# Patient Record
Sex: MALE | Race: WHITE
[De-identification: names, ages, dates, MRNs, and addresses within clinical notes are randomized per-mention and may not be internally consistent; named-entity substitution may affect disease eponyms.]

---

## 2018-02-08 ENCOUNTER — HOSPITAL ENCOUNTER (OUTPATIENT)
Dept: HOSPITAL 75 - PREOP | Age: 48
End: 2018-02-08
Attending: SURGERY
Payer: SELF-PAY

## 2018-02-08 VITALS — WEIGHT: 260 LBS | BODY MASS INDEX: 35.21 KG/M2 | HEIGHT: 72 IN

## 2018-02-08 DIAGNOSIS — Z01.818: Primary | ICD-10-CM

## 2018-02-08 DIAGNOSIS — K21.9: ICD-10-CM

## 2018-02-08 DIAGNOSIS — K92.1: ICD-10-CM

## 2018-02-13 ENCOUNTER — HOSPITAL ENCOUNTER (OUTPATIENT)
Dept: HOSPITAL 75 - ENDO | Age: 48
Discharge: HOME | End: 2018-02-13
Attending: SURGERY
Payer: SELF-PAY

## 2018-02-13 VITALS — SYSTOLIC BLOOD PRESSURE: 136 MMHG | DIASTOLIC BLOOD PRESSURE: 92 MMHG

## 2018-02-13 VITALS — SYSTOLIC BLOOD PRESSURE: 115 MMHG | DIASTOLIC BLOOD PRESSURE: 82 MMHG

## 2018-02-13 VITALS — DIASTOLIC BLOOD PRESSURE: 82 MMHG | SYSTOLIC BLOOD PRESSURE: 115 MMHG

## 2018-02-13 VITALS — BODY MASS INDEX: 35.21 KG/M2 | WEIGHT: 260 LBS | HEIGHT: 72 IN

## 2018-02-13 VITALS — SYSTOLIC BLOOD PRESSURE: 111 MMHG | DIASTOLIC BLOOD PRESSURE: 79 MMHG

## 2018-02-13 DIAGNOSIS — K44.9: ICD-10-CM

## 2018-02-13 DIAGNOSIS — B96.81: ICD-10-CM

## 2018-02-13 DIAGNOSIS — E03.9: ICD-10-CM

## 2018-02-13 DIAGNOSIS — K29.50: ICD-10-CM

## 2018-02-13 DIAGNOSIS — Z80.0: ICD-10-CM

## 2018-02-13 DIAGNOSIS — K62.5: Primary | ICD-10-CM

## 2018-02-13 DIAGNOSIS — K29.80: ICD-10-CM

## 2018-02-13 DIAGNOSIS — K62.1: ICD-10-CM

## 2018-02-13 DIAGNOSIS — K22.70: ICD-10-CM

## 2018-02-13 DIAGNOSIS — F17.210: ICD-10-CM

## 2018-02-13 DIAGNOSIS — D12.4: ICD-10-CM

## 2018-02-13 DIAGNOSIS — Z79.899: ICD-10-CM

## 2018-02-13 DIAGNOSIS — K25.9: ICD-10-CM

## 2018-02-13 PROCEDURE — 94640 AIRWAY INHALATION TREATMENT: CPT

## 2018-02-13 NOTE — DISCHARGE INST-SIMPLE/STANDARD
Discharge Inst-Standard


Discharge Medications


New, Converted or Re-Newed RX:  Transmitted to Pharmacy





Patient Instructions/Follow Up


Plan of Care/Instructions/FU:  


2 weeks Priti


Activity as Tolerated:  Yes


Discharge Diet:  Regular Diet











ELISA PETTIT DO Feb 13, 2018 10:45

## 2018-02-13 NOTE — PROGRESS NOTE-PRE OPERATIVE
Pre-Operative Progress Note


H&P Reviewed


The H&P was reviewed, patient examined and no changes noted.


Date Seen by Provider:  Feb 13, 2018


Time Seen by Provider:  08:22


Date H&P Reviewed:  Feb 13, 2018


Time H&P Reviewed:  08:22


Pre-Operative Diagnosis:  family history colon cancer, bright red blood per 

rectum, reflux gastritis











ELISA PETTIT DO Feb 13, 2018 08:23

## 2018-02-13 NOTE — OPERATIVE REPORT
DATE OF SERVICE:  02/13/2018



PREOPERATIVE DIAGNOSES:

Family history of colon cancer, bright red blood per rectum, reflux gastritis.



POSTOPERATIVE DIAGNOSES:

Duodenitis, gastritis, hiatal hernia, small antral ulcer, short segment

Hernandez's, descending colon polyp and rectal polyp.



PROCEDURE:

EGD with biopsies and colonoscopy with hot biopsy polypectomy x2.



SURGEON:

Elisa Marcos DO



ANESTHESIA:

Per CRNA.



ESTIMATED BLOOD LOSS:

None.



SPECIMENS:

Antrum, body, GE and colon polyps.



INDICATIONS:

The patient is a 48-year-old male with family history of colon cancer, bright

red blood per rectum and reflux gastritis.  He was explained risks and benefits

of procedure and wished to proceed with procedure.  Consent was signed in the

chart.



DESCRIPTION OF PROCEDURE:

The patient was taken to the endoscopy suite, placed in left lateral recumbent

position.  Timeout was performed.  The scope was inserted in mouth, down the

esophagus, stomach and into the duodenum.  The duodenum had a significant

erythematous changes present.  The patient began to desaturate therefore, the

scope had to be removed.  Once adequate oxygenation occurred again, the scope

was then reinserted in mouth, down the esophagus and into the stomach and into

the duodenum.  There were no polyps, masses or ulcerations visualized within the

duodenum.  There was significant erythematous changes thought consistent with

duodenitis.  The scope was retracted back into the stomach, where biopsy of the

antrum was obtained.  The stomach had slight erythematous changes diffusely. 

Biopsy of the antrum and GE junction were obtained.  In the antrum, there is a

small ulceration too that was present a small and biopsy had been obtained too. 

The scope was retroflexed noting a small hiatal hernia.  No other pathology

noted.  The scope was then slowly withdrawn back into the GE junction where

there are some erythematous changes and may be the appearance of a short segment

of Hernandez's.  Biopsies were obtained.  Scope was then slowly retracted back

noting no other pathology.  Digital rectal exam was performed.  A small external

hemorrhoid present.  Digital rectal exam, no palpable polyps, masses or

ulcerations.  The scope was inserted into the rectum and advanced all the way to

the cecum with minimal difficulty.  Prep was adequate.  Scope was then slowly

retracted back.  There were no polyps, masses or ulcerations within the cecum,

ascending, transverse colon.  Within the descending colon, a small polyp was

present, which hot biopsy polypectomy was performed.  Scope was continued slowly

retracted back into the sigmoid.  There were no polyps, mass or ulcerations.  In

the rectum, another small polyp was present, which hot biopsy polypectomy was

performed.  Scope was also retroflexed noting no other pathology.  Scope was

returned to its normal position, slowly withdrawn until completely removed.



RECOMMENDATIONS:

The patient will follow up in 2 weeks.  The patient will be started on Protonix

40 mg daily.  We would recommend repeat colonoscopy in 3 years for reevaluation.

 If he has any of this symptoms before that I would reevaluate at that time.





Job ID: [id number]

DocumentID: [id number]

Dictated Date:  02/13/2018 10:43:51

Transcription Date: 02/13/2018 15:21:56

Dictated By: ELISA MARCOS DO

## 2018-02-15 NOTE — XMS REPORT
[facility]

 Created on: 10/31/2015



Antonio Whitten

External Reference #: [id number]

: 1970

Sex: Male



Demographics







 Address  [address] [place] [address]  [address]

 

 Home Phone  [phone]

 

 Preferred Language  Unknown

 

 Marital Status  Unknown

 

 Rastafarian Affiliation  Unknown

 

 Race  White

 

 Ethnic Group  Not  or 





Author







 Author  ALVAREZ VALDEZ

 

 [facility]  [employer]

 

 Address  Unknown

 

 Phone  Unavailable







Care Team Providers







 Care Team Member Name  Role  Phone

 

 ALVAREZ VALDEZ  CP  Unavailable



                                                                



Allergies

          No Known Allergies                                                   
                                     



Problems

          





 Problem Type  Condition  Code  Onset Dates  Condition Status

 

 Assessment  Encounter for immunization  Z23     Active

 

 Problem  Need for prophylactic vaccination and inoculation, Influenza  V04.81 
    Active

 

 Problem  Health examination of defined subpopulation  V70.5     Active

 

 Problem  Hypertension  401.9     Active

 

 Problem  Other malaise and fatigue  780.79     Active

 

 Problem  Acute suppurative otitis media without spontaneous rupture of eardrum
  382.00     Active

 

 Problem  Dizziness and giddiness  780.4     Active

 

 Problem  Unspecified dermatitis due to sun  692.70     Active



                                                                               
                                                                               



Medications

          No Known Medications                                                 
                                       



Procedures

          





 Procedure  Coding System  Code  Date

 

 SINGLE IMMUNIZATION ADMIN  CPT-4  62927  Oct 31, 2015

 

 FLUARIX QUAD (3 & UP)-GSK-  CPT-4  54823  Oct 31, 2015



                                                                               
         



Results

          No Known Results                                                     
                                   



Immunizations

          





 Vaccine  Administration Date

 

 FLUARIX QUAD (3 & UP)-GSK-2015  Oct 31, 2015



                                                                    



Summary Purpose

          [employer] Submission

## 2018-02-15 NOTE — XMS REPORT
[facility]

 Created on: 2016



Antonio Whitten

External Reference #: [id number]

: 1970

Sex: Male



Demographics







 Address  [address] [place]  [address]

 

 Home Phone  [phone]

 

 Preferred Language  Unknown

 

 Marital Status  Unknown

 

 Pentecostal Affiliation  Unknown

 

 Race  White

 

 Ethnic Group  Not  or 





Author







 Author  DONAVAN SANDERS

 

 [facility]  [employer]

 

 Address  Unknown

 

 Phone  Unavailable







Care Team Providers







 Care Team Member Name  Role  Phone

 

 DONAVAN SANDERS  CP  Unavailable



                                                                



Allergies

          No Known Allergies                                                   
                                     



Problems

          





 Problem Type  Condition  Code  Onset Dates  Condition Status

 

 Problem  Acute suppurative otitis media without spontaneous rupture of eardrum
  382.00     Active

 

 Problem  Unspecified dermatitis due to sun  692.70     Active

 

 Problem  Other malaise and fatigue  780.79     Active

 

 Problem  Acquired hypothyroidism  E03.9     Active

 

 Problem  Anxiety  F41.9     Active

 

 Problem  Chronic pain syndrome  G89.4     Active

 

 Problem  Health examination of defined subpopulation  V70.5     Active

 

 Problem  Dizziness and giddiness  780.4     Active

 

 Problem  Hypertension  401.9     Active

 

 Problem  Need for prophylactic vaccination and inoculation, Influenza  V04.81 
    Active



                                                                               
                                                                               
                    



Medications

          No Known Medications                                                 
                             



Results

          No Known Results                                                     
               



Summary Purpose

          [employer] Submission

## 2018-02-15 NOTE — XMS REPORT
[facility]

 Created on: 2017



Antonio Whitten

External Reference #: [id number]

: 1970

Sex: Male



Demographics







 Address  [address]  [address]

 

 Preferred Language  Unknown

 

 Marital Status  Unknown

 

 Jehovah's witness Affiliation  Unknown

 

 Race  Unknown

 

 Ethnic Group  Unknown





Author







 Author  ALVAREZ VALDEZ

 

 [facility]

 

 Address  [facility] [place]  [address]



 

 Phone  [phone]







Care Team Providers







 Care Team Member Name  Role  Phone

 

 ALVAREZ VALDEZ  Unavailable  [phone]







PROBLEMS







 Type  Condition  ICD9-CM Code  VGN95-PF Code  Onset Dates  Condition Status  
SNOMED Code

 

 Problem  Hypothyroidism (acquired)     E03.9     Active  17335509

 

 Problem  Chronic pain syndrome     G89.4     Active  291559477

 

 Problem  Anxiety     F41.9     Active  16570352

 

 Problem  Acquired hypothyroidism     E03.9     Active  [id number]







ALLERGIES

Unknown Allergies



SOCIAL HISTORY

No smoking Hx information available



PLAN OF CARE





VITAL SIGNS





MEDICATIONS







 Medication  Instructions  Dosage  Frequency  Start Date  End Date  Duration  
Status

 

 Norco 5-325 MG  Orally every 6 hrs  1 tablet as needed  6h       
28 days  Active

 

 Ativan 0.5 MG  Orally every 6 hrs  1 tablet as needed  6h  13 Oct, 2016     28 
days  Active







RESULTS

No Results



PROCEDURES

No Known procedures



IMMUNIZATIONS

No Known Immunizations

## 2018-02-15 NOTE — XMS REPORT
Continuity of Care Document

 Created on: 02/15/2018



LUCAS DUPREE

External Reference #: [id number]

: 1970

Sex: Male



Demographics







 Address  [address] [place]  [address]

 

 Home Phone  [phone] x

 

 Preferred Language  Unknown

 

 Marital Status  Unknown

 

 Baptism Affiliation  Unknown

 

 Race  Unknown

 

 Ethnic Group  Unknown





Author







 Author  [facility]

 

 Address  Unknown

 

 Phone  Unavailable



              



Allergies

      



There is no data.                  



Medications

      



There is no data.                  



Problems

      





 Date Dx Coded            Attending            Type            Code            
Diagnosis            Diagnosed By        

 

 2009            ALVAREZ TAYLOR                         788.1      
      DYSURIA                     

 

 2009            ALVAREZ TAYLOR                         788.1      
      DYSURIA                     

 

 2009            HERMELINDA ELMORE DO                         788.1          
  DYSURIA                     

 

 2009                                      788.1            DYSURIA      
               

 

 2009            ALVAREZ TAYLOR                         788.1      
      DYSURIA                     

 

 2009            ROX REGAN MD                         788.1         
   DYSURIA                     

 

 2009            ALVAREZ TAYLOR                         V70.4      
      EXAMINATION FOR MEDICOLEGAL REASONS                     

 

 2009            ALVAREZ TAYLOR                         V70.4      
      EXAMINATION FOR MEDICOLEGAL REASONS                     

 

 2009            HERMELINDA ELMORE DO                         V70.4          
  EXAMINATION FOR MEDICOLEGAL REASONS                     

 

 2009                                      V70.4            EXAMINATION 
FOR MEDICOLEGAL REASONS                     

 

 2009            ALVAREZ TAYLOR                         V70.4      
      EXAMINATION FOR MEDICOLEGAL REASONS                     

 

 2009            ROX REGAN MD                         V70.4         
   EXAMINATION FOR MEDICOLEGAL REASONS                     

 

 2010            ALVAREZ TAYLOR                         724.2      
      LUMBAGO                     

 

 2010            ALVAREZ TAYLOR                         724.2      
      LUMBAGO                     

 

 2010            HERMELINDA ELMORE DO                         724.2          
  LUMBAGO                     

 

 2010                                      724.2            LUMBAGO      
               

 

 2010            ALVAREZ TAYLOR                         724.2      
      LUMBAGO                     

 

 2010            ROX REGAN MD                         724.2         
   LUMBAGO                     

 

 2011            ALVAREZ TAYLOR                         535.50     
       GASTRITIS UNSPEC                     

 

 2011            ALVAREZ TAYLOR                         786.50     
       CHEST PAIN                     

 

 2011            ALVAREZ TAYLOR                         535.50     
       GASTRITIS UNSPEC                     

 

 2011            ALVAREZ TAYLOR                         786.50     
       CHEST PAIN                     

 

 2011            HERMELINDA ELMORE DO                         535.50         
   GASTRITIS UNSPEC                     

 

 2011            HERMELINDA ELMORE DO K                         786.50         
   CHEST PAIN                     

 

 2011                                      535.50            GASTRITIS 
UNSPEC                     

 

 2011                                      786.50            CHEST PAIN  
                   

 

 2011            ALVAREZ TAYLOR                         535.50     
       GASTRITIS UNSPEC                     

 

 2011            ALVAREZ TAYLOR                         786.50     
       CHEST PAIN                     

 

 2011            ROX REGAN MD                         535.50        
    GASTRITIS UNSPEC                     

 

 2011            ROX REGAN MD                         786.50        
    CHEST PAIN                     

 

 2011            ALVAREZ TAYLOR                         785.9      
      CAROTID BRUIT                     

 

 2011            ALVAREZ TAYLOR                         785.9      
      CAROTID BRUIT                     

 

 2011            HERMELINDA ELMORE DO                         785.9          
  CAROTID BRUIT                     

 

 2011                                      785.9            CAROTID BRUIT
                     

 

 2011            ALVAREZ TAYLOR                         785.9      
      CAROTID BRUIT                     

 

 2011            ROX REGAN MD                         785.9         
   CAROTID BRUIT                     

 

 2011            ALVAREZ TAYLOR                         214.1      
      LIPOMA OF OTHER SKIN AND SUBCUTANEOUS TISSUE                     

 

 2011            ALVAREZ TAYLOR                         214.1      
      LIPOMA OF OTHER SKIN AND SUBCUTANEOUS TISSUE                     

 

 2011            HERMELINDA ELMORE DO                         214.1          
  LIPOMA OF OTHER SKIN AND SUBCUTANEOUS TISSUE                     

 

 2011                                      214.1            LIPOMA OF 
OTHER SKIN AND SUBCUTANEOUS TISSUE                     

 

 2011            ALVAREZ TAYLOR                         214.1      
      LIPOMA OF OTHER SKIN AND SUBCUTANEOUS TISSUE                     

 

 2011            ROX REGAN MD                         214.1         
   LIPOMA OF OTHER SKIN AND SUBCUTANEOUS TISSUE                     

 

 2011            ALVAREZ TAYLOR                         V58.32     
       SUTURE REMOVAL                     

 

 2011            ALVAREZ TAYLOR                         V58.32     
       SUTURE REMOVAL                     

 

 2011            HERMELINDA ELMORE DO                         V58.32         
   SUTURE REMOVAL                     

 

 2011                                      V58.32            SUTURE 
REMOVAL                     

 

 2011            ALVAREZ TAYLOR                         V58.32     
       SUTURE REMOVAL                     

 

 2011            ROX REGAN MD                         V58.32        
    SUTURE REMOVAL                     

 

 2011            ALVAREZ TAYLOR                         354.0      
      CARPAL TUNNEL SYNDROME                     

 

 2011            ALVAREZ TAYLOR                         354.0      
      CARPAL TUNNEL SYNDROME                     

 

 2011            HERMELINDA ELMORE DO                         354.0          
  CARPAL TUNNEL SYNDROME                     

 

 2011                                      354.0            CARPAL TUNNEL 
SYNDROME                     

 

 2011            ALVAREZ TAYLOR                         354.0      
      CARPAL TUNNEL SYNDROME                     

 

 2011            ROX REGAN MD                         354.0         
   CARPAL TUNNEL SYNDROME                     

 

 2012            ALVAREZ TAYLOR                         780.4      
      DIZZINESS AND VERTIGO                     

 

 2012            ALVAREZ TAYLOR                         780.4      
      DIZZINESS AND VERTIGO                     

 

 2012            HERMELINDA ELMORE DO                         780.4          
  DIZZINESS AND VERTIGO                     

 

 2012                                      780.4            DIZZINESS AND 
VERTIGO                     

 

 2012            ALVAREZ TAYLOR                         780.4      
      DIZZINESS AND VERTIGO                     

 

 2012            ROX REGAN MD                         780.4         
   DIZZINESS AND VERTIGO                     

 

 2012            ALVAREZ TAYLOR                         382.00     
       OTITIS MEDIA ACUTE SUPPURATIVE                     

 

 2012            ALVAREZ TAYLOR                         382.00     
       OTITIS MEDIA ACUTE SUPPURATIVE                     

 

 2012            HERMELINDA ELMORE DO                         382.00         
   OTITIS MEDIA ACUTE SUPPURATIVE                     

 

 2012                                      382.00            OTITIS MEDIA 
ACUTE SUPPURATIVE                     

 

 2012            ALVAREZ TAYLOR                         382.00     
       OTITIS MEDIA ACUTE SUPPURATIVE                     

 

 2012            ROX REGAN MD                         382.00        
    OTITIS MEDIA ACUTE SUPPURATIVE                     

 

 2012            HERMELINDA ELMORE DO                         780.79         
   fatigue                     

 

 2012                                      780.79            fatigue     
                

 

 2012            ALVAREZ TAYLOR                         780.79     
       fatigue                     

 

 2012            ROX REGAN MD                         780.79        
    fatigue                     

 

 2013                                      692.70            UNSPECIFIED 
DERMATITIS DUE TO SUN                     

 

 2013            ALVAREZ TAYLOR                         692.70     
       UNSPECIFIED DERMATITIS DUE TO SUN                     

 

 2013            ROX REGAN MD                         692.70        
    UNSPECIFIED DERMATITIS DUE TO SUN                     

 

 2013            ALVAREZ TAYLOR                         V04.81     
       FLU SHOT                     

 

 2013            ALVAREZ TAYLOR                         V70.5      
      PREEMPLOYMENT/ EXAM                     

 

 2013            ROX REGAN MD                         V04.81        
    FLU SHOT                     

 

 2013            ROX REGAN MD                         V70.5         
   PREEMPLOYMENT/ EXAM                     



                                                                               
                                                                               
            



Procedures

      





 Code            Description            Performed By            Performed On   
     

 

             11804                                  CBC                        
           2012        

 

             41269                                  CMP                        
           2012        

 

             18460                                  LIPID PANEL                
                   2012        

 

             4499884                                  GFR CALC (RESULT ONLY)   
                                2012        

 

             01207                                  CRP HS (CARDIO)            
                       2012        

 

             49139                                  TSH                        
           2012        

 

             55697                                  BNP                        
           2012        

 

             48837                                  EKG, TRACING (IN-HOUSE)    
                               2012        

 

             93240                                  ROUTINE VENIPUNCTURE       
                            2012        

 

             99297                                  A1C (IN-HOUSE)             
                      2012        

 

             71617                                  XRAY CHEST 2 VIEW          
                         2012        

 

             Cardiolog                                  [place]            
                       2012        

 

             45260                                  T4 FREE                    
               2012        

 

             00377                                  T3 TOTAL                   
                2012        

 

             11633                                  UA LONG DIP                
                   2013        

 

             77742                                  PURE TONE HEARING TEST AIR 
                                  2013        

 

             53495                                  ROUTINE VENIPUNCTURE       
                            2013        

 

             74527                                  A1C (IN-HOUSE)             
                      2013        

 

             7007154                                  GFR CALC (RESULT ONLY)   
                                2013        

 

             35899                                  CMP                        
           2013        

 

             17833                                  LIPID PANEL                
                   2013        

 

             34156                                  CBC                        
           2013        



                                                            



Results

      



There is no data.              



Encounters

      





 ACCT No.            Visit Date/Time            Discharge            Status    
        Pt. Type            Provider            Facility            Loc./Unit  
          Complaint        

 

 [id number]            2013 08:00:00            2013 23:59:59          
  CLS            Outpatient            ROX REGAN MD                       
                        

 

 [id number]            2013 09:28:00            2013 23:59:59          
  CLS            Outpatient            ALVAREZ TAYLOR                    
                           

 

 [id number]            2012 09:00:00            2012 23:59:59          
  CLS            Outpatient            HERMELINDA ELMORE DO                        
                       

 

 [id number]            2012 08:26:00            2012 23:59:59          
  CLS            Outpatient            ALVAREZ TAYLOR                    
                           

 

 [id number]            2012 17:03:00            2012 23:59:59            
CLS            Outpatient            ALVAREZ TAYLOR                      
                         

 

 [id number]            2013 13:04:00                                      
Document Registration

## 2018-02-15 NOTE — XMS REPORT
[facility]

 Created on: 10/01/2017



Antonio Whitten

External Reference #: [id number]

: 1970

Sex: Male



Demographics







 Address  [address]  [address]

 

 Preferred Language  Unknown

 

 Marital Status  Unknown

 

 Rastafari Affiliation  Unknown

 

 Race  Unknown

 

 Ethnic Group  Unknown





Author







 Author  ALVAREZ VALDEZ

 

 [facility]

 

 Address  [address] [place]  [address]



 

 Phone  [phone]







Care Team Providers







 Care Team Member Name  Role  Phone

 

 ALVAREZ VALDEZ  Unavailable  [phone]







PROBLEMS







 Type  Condition  ICD9-CM Code  FVV12-EJ Code  Onset Dates  Condition Status  
SNOMED Code

 

 Problem  Hypothyroidism (acquired)     E03.9     Active  74404250

 

 Problem  Chronic pain syndrome     G89.4     Active  467388694

 

 Problem  Anxiety     F41.9     Active  60321880

 

 Problem  Acquired hypothyroidism     E03.9     Active  374476961







ALLERGIES

No Known Allergies



SOCIAL HISTORY

Never Assessed



PLAN OF CARE





VITAL SIGNS







 Height  71 in  2017

 

 Weight  254.5 lbs  2017

 

 Temperature  98.0 degrees Fahrenheit  2017

 

 Heart Rate  80 bpm  2017

 

 Respiratory Rate  20   2017

 

 BMI  35.49 kg/m2  2017

 

 Blood pressure systolic  122 mmHg  2017

 

 Blood pressure diastolic  78 mmHg  2017







MEDICATIONS







 Medication  Instructions  Dosage  Frequency  Start Date  End Date  Duration  
Status

 

 Ativan 1 MG  Orally every 6 hrs  1 tablet as needed  6h  13 Oct, 2016     28 
days  Active

 

 Norco 5-325 MG  Orally every 6 hrs  1 tablet as needed  6h  03 Mar, 2017     
28 days  Active

 

 Levothyroxine Sodium 25 MCG  Orally Once a day  1 tablet on an empty stomach 
in the morning  24h        30  Active







RESULTS

No Results



PROCEDURES

No Known procedures



IMMUNIZATIONS

No Known Immunizations



MEDICAL (GENERAL) HISTORY







 Type  Description  Date

 

 Medical History  mild anxiety   

 

 Surgical History  appendectomy  

 

 Hospitalization History  surgery   

 

 Hospitalization History  dizziness due to inner ear infection

## 2018-02-15 NOTE — XMS REPORT
[facility]

 Created on: 2017



Antonio Whitten

External Reference #: [id number]

: 1970

Sex: Male



Demographics







 Address  [address]  [address]

 

 Preferred Language  Unknown

 

 Marital Status  Unknown

 

 Sikhism Affiliation  Unknown

 

 Race  Unknown

 

 Ethnic Group  Unknown





Author







 Author  DONAVAN Pate

 

 [facility]

 

 Address  Unknown

 

 Phone  [phone]







Care Team Providers







 Care Team Member Name  Role  Phone

 

 DONAVAN Pate  Unavailable  [phone]







PROBLEMS







 Type  Condition  ICD9-CM Code  XKO97-VI Code  Onset Dates  Condition Status  
SNOMED Code

 

 Problem  Hypothyroidism (acquired)     E03.9     Active  48006165

 

 Problem  Chronic pain syndrome     G89.4     Active  606948245

 

 Problem  Acquired hypothyroidism     E03.9     Active  019806428

 

 Problem  Anxiety     F41.9     Active  83678562







ALLERGIES







 Substance  Reaction  Event Type  Date  Status

 

 N.K.D.A.  Unknown  Non Drug Allergy    Unknown







SOCIAL HISTORY

No smoking Hx information available



PLAN OF CARE







 Activity  Details









  









 Follow Up  prn Reason:tr/hygiene







VITAL SIGNS







 Height  71 in  2016

 

 Blood pressure systolic  129 mmHg  2016

 

 Blood pressure diastolic  80 mmHg  2016







MEDICATIONS







 Medication  Instructions  Dosage  Frequency  Start Date  End Date  Duration  
Status

 

 Ativan 0.5 MG  Orally every 6 hrs  1 tablet as needed  6h  13 Oct, 2016        
Active

 

 Norco 5-325 MG  Orally every 6 hrs  1 tablet as needed  6h  12 Sep, 2016      
  Active

 

 Cetirizine HCl 10 mg  Orally Once a day  1 tablet  24h  08 Aug, 2016  4 2017  30 day(s)  Active

 

 Levothyroxine Sodium 25 MCG  Orally Once a day  1 tablet on an empty stomach 
in the morning  24h        30  Active







RESULTS

No Results



PROCEDURES







 Procedure  Date Ordered  Related Diagnosis  Body Site

 

 LTD ORAL EVALUATION - PROBLEM FOCUS  2016      

 

 INTRAORL-PERIAPICAL 1 FILM 18685  2016      

 

 EXTRAC ERUPTED TOOTH/EXPOSED ROOT  2016      







IMMUNIZATIONS

No Known Immunizations

## 2018-02-15 NOTE — XMS REPORT
[facility]

 Created on: 2016



Antonio Whitten

External Reference #: [id number]

: 1970

Sex: Male



Demographics







 Address  [address] [place] [address]  [address]

 

 Home Phone  [phone]

 

 Preferred Language  Unknown

 

 Marital Status  Unknown

 

 Baptism Affiliation  Unknown

 

 Race  White

 

 Ethnic Group  Not  or 





Author







 Author  ALVAREZ VALDEZ

 

 Organization  [employer]

 

 Address  Unknown

 

 Phone  Unavailable







Care Team Providers







 Care Team Member Name  Role  Phone

 

 ALVAREZ VALDEZ  CP  Unavailable



                                                                



Allergies

          No Known Allergies                                                   
                                     



Problems

          





 Problem Type  Condition  Code  Onset Dates  Condition Status

 

 Problem  Acute suppurative otitis media without spontaneous rupture of eardrum
  382.00     Active

 

 Problem  Unspecified dermatitis due to sun  692.70     Active

 

 Problem  Other malaise and fatigue  780.79     Active

 

 Problem  Acquired hypothyroidism  E03.9     Active

 

 Problem  Anxiety  F41.9     Active

 

 Problem  Chronic pain syndrome  G89.4     Active

 

 Problem  Health examination of defined subpopulation  V70.5     Active

 

 Problem  Dizziness and giddiness  780.4     Active

 

 Problem  Hypertension  401.9     Active

 

 Problem  Need for prophylactic vaccination and inoculation, Influenza  V04.81 
    Active



                                                                               
                                                                               
                    



Medications

          No Known Medications                                                 
                             



Results

          No Known Results                                                     
               



Summary Purpose

          [employer] Submission

## 2018-02-15 NOTE — XMS REPORT
[facility]

 Created on: 09/10/2017



Antonio Whitten

External Reference #: [id number]

: 1970

Sex: Male



Demographics







 Address  [address]  [address]

 

 Preferred Language  Unknown

 

 Marital Status  Unknown

 

 Restorationist Affiliation  Unknown

 

 Race  Unknown

 

 Ethnic Group  Unknown





Author







 Author  LALO SERNA

 

 Organization  [facility]

 

 Address  [address] [place]  [address]



 

 Phone  [phone]







Care Team Providers







 Care Team Member Name  Role  Phone

 

 LALO SERNA  Unavailable  [phone]







PROBLEMS







 Type  Condition  ICD9-CM Code  JWN92-NX Code  Onset Dates  Condition Status  
SNOMED Code

 

 Problem  Hypothyroidism (acquired)     E03.9     Active  08440887

 

 Problem  Chronic pain syndrome     G89.4     Active  143940422

 

 Problem  Anxiety     F41.9     Active  97327339

 

 Problem  Acquired hypothyroidism     E03.9     Active  166852472







ALLERGIES







 Substance  Reaction  Event Type  Date  Status

 

 N.K.D.A.  Unknown  Non Drug Allergy    Unknown







SOCIAL HISTORY

No smoking Hx information available



PLAN OF CARE







 Activity  Details









  









 Follow Up  prn Reason:te







VITAL SIGNS





MEDICATIONS







 Medication  Instructions  Dosage  Frequency  Start Date  End Date  Duration  
Status

 

 Norco 5-325 MG  Orally every 6 hrs  1 tablet as needed  6h       
28 days  Active

 

 Ativan 0.5 MG  Orally every 6 hrs  1 tablet as needed  6h  13 Oct, 2016     28 
days  Active

 

 Levothyroxine Sodium 25 MCG  Orally Once a day  1 tablet on an empty stomach 
in the morning  24h        30  Active

 

 Cetirizine HCl 10 mg  Orally Once a day  1 tablet  24h  08 Aug, 2016  4 Feb, 
2017  30 day(s)  Active







RESULTS

No Results



PROCEDURES







 Procedure  Date Ordered  Related Diagnosis  Body Site

 

 INTRAORL-PERIAPICAL 1 FILM 24610  2017      

 

 INTRAORL-PERIAPICAL EA ADD FILM  2017      

 

 INTRAORL-PERIAPICAL EA ADD FILM  2017      

 

 INTRAORL-PERIAPICAL EA ADD FILM  2017      

 

 PANORAMIC FILM SEE ALSO CODE 67667  2017      

 

 BITEWINGS - FOUR FILMS  2017      







IMMUNIZATIONS

No Known Immunizations

## 2018-02-15 NOTE — XMS REPORT
[facility]

 Created on: 2016



Antonio Whitten

External Reference #: [id number]

: 1970

Sex: Male



Demographics







 Address  [address] [place] [address]  [address]

 

 Home Phone  [phone]

 

 Preferred Language  Unknown

 

 Marital Status  Unknown

 

 Anabaptism Affiliation  Unknown

 

 Race  White

 

 Ethnic Group  Not  or 





Author







 Author  ALVAREZ VALDEZ

 

 Organization  [employer]

 

 Address  Unknown

 

 Phone  Unavailable







Care Team Providers







 Care Team Member Name  Role  Phone

 

 ALVAREZ VALDEZ  CP  Unavailable



                                                                



Allergies

          No Known Allergies                                                   
                                     



Problems

          





 Problem Type  Condition  Code  Onset Dates  Condition Status

 

 Problem  Acute suppurative otitis media without spontaneous rupture of eardrum
  382.00     Active

 

 Problem  Unspecified dermatitis due to sun  692.70     Active

 

 Problem  Other malaise and fatigue  780.79     Active

 

 Assessment  Anxiety  F41.9     Active

 

 Assessment  Chronic pain syndrome  G89.4     Active

 

 Problem  Acquired hypothyroidism  E03.9     Active

 

 Problem  Anxiety  F41.9     Active

 

 Problem  Chronic pain syndrome  G89.4     Active

 

 Problem  Health examination of defined subpopulation  V70.5     Active

 

 Problem  Dizziness and giddiness  780.4     Active

 

 Problem  Hypertension  401.9     Active

 

 Problem  Need for prophylactic vaccination and inoculation, Influenza  V04.81 
    Active



                                                                               
                                                                               
                                        



Medications

          





 Medication  Code System  Code  Instructions  Start Date  End Date  Status  
Dosage

 

 Ativan  NDC  [address]-70  0.5 MG Orally every 6 hrs  Oct 13, 2016        1 
tablet as needed

 

 Norco  NDC  [address]-22  5-325 MG Orally every 6 hrs  2016        1 
tablet as needed

 

 Cetirizine HCl  NDC  [address]-23  10 mg Orally Once a day  Aug 08, 2016  Feb 
04, 2017     1 tablet

 

 Levothyroxine Sodium  NDC  15299467764  25 MCG Orally Once a day           1 
tablet on an empty stomach in the morning



                                                                               
                             



Results

          No Known Results                                                     
               



Summary Purpose

          [employer] Submission

## 2018-02-15 NOTE — XMS REPORT
[facility]

 Created on: 09/15/2017



Antonio Whitten

External Reference #: [id number]

: 1970

Sex: Male



Demographics







 Address  [address]  [address]

 

 Preferred Language  Unknown

 

 Marital Status  Unknown

 

 Sabianism Affiliation  Unknown

 

 Race  Unknown

 

 Ethnic Group  Unknown





Author







 Author  ALVAREZ VALDEZ

 

 [facility]

 

 Address  [address] [place]  [address]



 

 Phone  [phone]







Care Team Providers







 Care Team Member Name  Role  Phone

 

 ALVAREZ VALDEZ  Unavailable  [phone]







PROBLEMS







 Type  Condition  ICD9-CM Code  VTP65-MX Code  Onset Dates  Condition Status  
SNOMED Code

 

 Problem  Hypothyroidism (acquired)     E03.9     Active  38845412

 

 Problem  Chronic pain syndrome     G89.4     Active  508521616

 

 Problem  Anxiety     F41.9     Active  44118121

 

 Problem  Acquired hypothyroidism     E03.9     Active  417603418







ALLERGIES

Unknown Allergies



SOCIAL HISTORY

No smoking Hx information available



PLAN OF CARE





VITAL SIGNS





MEDICATIONS







 Medication  Instructions  Dosage  Frequency  Start Date  End Date  Duration  
Status

 

 Ativan 0.5 MG  Orally every 6 hrs  1 tablet as needed  6h  13 Oct, 2016     28 
days  Active

 

 Norco 5-325 MG  Orally every 6 hrs  1 tablet as needed  6h       
28 days  Active







RESULTS

No Results



PROCEDURES

No Known procedures



IMMUNIZATIONS

No Known Immunizations

## 2018-02-15 NOTE — XMS REPORT
[facility]

 Created on: 10/18/2016



Antonio Whitten

External Reference #: [id number]

: 1970

Sex: Male



Demographics







 Address  [address] [place] [address]  [address]

 

 Home Phone  [phone]

 

 Preferred Language  Unknown

 

 Marital Status  Unknown

 

 Quaker Affiliation  Unknown

 

 Race  White

 

 Ethnic Group  Not  or 





Author







 Author  ALVAREZ VALDEZ

 

 [facility]  [employer]

 

 Address  Unknown

 

 Phone  Unavailable







Care Team Providers







 Care Team Member Name  Role  Phone

 

 ALVAREZ VALDEZ  CP  Unavailable



                                                                



Allergies, Adverse Reactions, Alerts

          





 Substance  Reaction  Event Type

 

 N.K.D.A.  Info Not Available  Non Drug Allergy



                                                                               
         



Problems

          





 Problem Type  Condition  Code  Onset Dates  Condition Status

 

 Assessment  Acquired hypothyroidism  E03.9     Active

 

 Problem  Other malaise and fatigue  780.79     Active

 

 Problem  Acute suppurative otitis media without spontaneous rupture of eardrum
  382.00     Active

 

 Assessment  Encounter for immunization  Z23     Active

 

 Assessment  Anxiety  F41.9     Active

 

 Problem  Anxiety  F41.9     Active

 

 Problem  Hypertension  401.9     Active

 

 Problem  Acquired hypothyroidism  E03.9     Active

 

 Problem  Dizziness and giddiness  780.4     Active

 

 Problem  Unspecified dermatitis due to sun  692.70     Active

 

 Problem  Need for prophylactic vaccination and inoculation, Influenza  V04.81 
    Active

 

 Problem  Health examination of defined subpopulation  V70.5     Active



                                                                               
                                                                               
                                        



Medications

          





 Medication  Code System  Code  Instructions  Start Date  End Date  Status  
Dosage

 

 Norco  NDC  [address]-01  5-325 MG Orally every 6 hrs  2016        1 
tablet as needed

 

 Cetirizine HCl  NDC  [address]-52  10 mg Orally Once a day  Aug 08, 2016  Feb 
04, 2017     1 tablet

 

 Ativan  NDC  [address]-66  0.5 MG Orally every 6 hrs  Oct 13, 2016        1 
tablet as needed

 

 Levothyroxine Sodium  NDC  [address]-53  25 MCG Orally Once a day  2016        1 tablet on an empty stomach in the morning



                                                                               
                                       



Procedures

          





 Procedure  Coding System  Code  Date

 

 FLUZONE HIGH DOSE 65 AND UP   CPT-4  16402  Oct 13, 2016

 

 SINGLE IMMUNIZATION ADMIN  CPT-4  11344  Oct 13, 2016

 

 Office Visit, Est Pt., Level 3  CPT-4  75702  Oct 13, 2016

 

 VENIPUNCT, ROUTINE*  CPT-4  87581  Oct 13, 2016

 

 ASSAY THYROID STIM HORMONE  CPT-4  42996  Oct 13, 2016



                                                                               
                                                           



Vital Signs

          





 Date/Time:  Oct 13, 2016

 

 Cardiac Monitoring Heart Rate  70 bpm

 

 Weight  248.6 lbs

 

 Height  71 in

 

 BMI  34.67 Index

 

 Blood Pressure Diastolic  86 mmHg

 

 Blood Pressure Systolic  120 mmHg



                                                                    



Results

          





 Name  Result  Date  Reference Range  Unit  Abnormality Flag

 

 ROUTINE VENIPUNCTURE               



                                                                               
         



Immunizations

          





 Vaccine  Administration Date

 

 FLUZONE HIGH DOSE 65 AND UP 2016  Oct 13, 2016



                                                                    



Summary Purpose

          [employer] Submission

## 2018-02-15 NOTE — XMS REPORT
[facility]

 Created on: 2015



Antonio Whitten

External Reference #: [id number]

: 1970

Sex: Male



Demographics







 Address  [address] [place] [address]  [address]

 

 Home Phone  [phone]

 

 Preferred Language  Unknown

 

 Marital Status  Unknown

 

 Spiritism Affiliation  Unknown

 

 Race  White

 

 Ethnic Group  Not  or 





Author







 Author  ALVAREZ VALDEZ

 

 Organization  [employer]

 

 Address  Unknown

 

 Phone  Unavailable







Care Team Providers







 Care Team Member Name  Role  Phone

 

 ALVAREZ VALDEZ  CP  Unavailable



                                                                



Allergies, Adverse Reactions, Alerts

          





 Substance  Reaction  Event Type

 

 N.K.D.A.  Info Not Available  Non Drug Allergy



                                                                               
         



Problems

          





 Problem Type  Condition  ICD-9 Code  Onset Dates  Condition Status

 

 Assessment  Knee pain  719.46     Active

 

 Assessment  Mood disorder  296.90     Active

 

 Assessment  Back pain  724.5     Active

 

 Problem  Need for prophylactic vaccination and inoculation, Influenza  V04.81 
    Active

 

 Problem  Health examination of defined subpopulation  V70.5     Active

 

 Problem  Hypertension  401.9     Active

 

 Problem  Other malaise and fatigue  780.79     Active

 

 Problem  Acute suppurative otitis media without spontaneous rupture of eardrum
  382.00     Active

 

 Problem  Dizziness and giddiness  780.4     Active

 

 Problem  Unspecified dermatitis due to sun  692.70     Active



                                                                               
                                                                               
                    



Medications

          





 Medication  Code System  Code  Instructions  Start Date  End Date  Status  
Dosage

 

 Lexapro  NDC  20724140079  10 MG Orally Once a day           1 tablet

 

 Diclofenac Sodium  NDC  [address]-06  75 MG Orally Twice a day  2015
        1 tablet



                                                                               
                   



Procedures

          





 Procedure  Coding System  Code  Date

 

 Office Visit, Est Pt., Level 3  CPT-4  03310  2015



                                                                               
                   



Vital Signs

          





 Date/Time:  2015

 

 Temperature  97.4 F

 

 Weight  259.3 lbs

 

 Height  71 in

 

 BMI  36.16 Index

 

 Blood Pressure Diastolic  84 mmHg

 

 Blood Pressure Systolic  128 mmHg

 

 Cardiac Monitoring Heart Rate  80 bpm



                                                                              



Results

          No Known Results                                                     
               



Summary Purpose

          [employer] Submission

## 2018-02-15 NOTE — XMS REPORT
[facility]

 Created on: 2017



Antonio Whitten

External Reference #: [id number]

: 1970

Sex: Male



Demographics







 Address  [address] [place]  [address]

 

 Preferred Language  Unknown

 

 Marital Status  Unknown

 

 Episcopalian Affiliation  Unknown

 

 Race  Unknown

 

 Ethnic Group  Unknown





Author







 Author  ALVAREZ VALEDZ

 

 [facility]

 

 Address  [address] [place]  [address]



 

 Phone  [phone]







Care Team Providers







 Care Team Member Name  Role  Phone

 

 ALVAREZ VALDEZ  Unavailable  [phone]







PROBLEMS







 Type  Condition  ICD9-CM Code  TAG72-RJ Code  Onset Dates  Condition Status  
SNOMED Code

 

 Assessment  Allergic rhinitis, unspecified allergic rhinitis trigger, 
unspecified rhinitis seasonality     J30.9  08 Aug, 2016  Active  89766684

 

 Problem  Acute suppurative otitis media without spontaneous rupture of eardrum
  382.00        Active  43282132

 

 Assessment  OME (otitis media with effusion), bilateral     H65.93  08 Aug, 
2016  Active  46089630

 

 Assessment  Hyperglycemia     R73.9  08 Aug, 2016  Active  47034536

 

 Assessment  Weight loss of more than 10% body weight     R63.4  08 Aug, 2016  
Active  757701808

 

 Problem  Hypertension  401.9        Active  52076370

 

 Problem  Need for prophylactic vaccination and inoculation, Influenza  V04.81 
       Active  442743541

 

 Problem  Unspecified dermatitis due to sun  692.70        Active  03871274

 

 Problem  Other malaise and fatigue  780.79        Active  152231533

 

 Problem  Health examination of defined subpopulation  V70.5        Active  
650136760

 

 Problem  Dizziness and giddiness  780.4        Active  808524303







ALLERGIES







 Substance  Reaction  Event Type  Date  Status

 

 N.K.D.A.  Unknown  Non Drug Allergy  08 Aug, 2016  Unknown







SOCIAL HISTORY

No smoking Hx information available



PLAN OF CARE





VITAL SIGNS







 Height  71 in  2016

 

 Weight  250.2 lbs  2016

 

 Heart Rate  72 bpm  2016

 

 Respiratory Rate  18   2016

 

 BMI  34.89 kg/m2  2016

 

 Blood pressure systolic  132 mmHg  2016

 

 Blood pressure diastolic  80 mmHg  2016







MEDICATIONS







 Medication  Instructions  Dosage  Frequency  Start Date  End Date  Duration  
Status

 

 Cetirizine HCl 10 mg  Orally Once a day  1 tablet  24h  08 Aug, 2016  4 Feb, 
2017  30 day(s)  Active







RESULTS







 Name  Result  Date  Reference Range

 

 TSH     2016   

 

 TSH  6.170     0.450-4.500

 

 CBC     2016   

 

 WBC  10.5     3.4-10.8

 

 RBC  4.93     4.14-5.80

 

 Hemoglobin  15.1     12.6-17.7

 

 Hematocrit  45.2     37.5-51.0

 

 MCV  92     79-97

 

 MCH  30.6     26.6-33.0

 

 MCHC  33.4     31.5-35.7

 

 RDW  13.9     12.3-15.4

 

 Platelets  250     150-379

 

 Neutrophils  63      

 

 Lymphs  30      

 

 Monocytes  5      

 

 Eos  2      

 

 Basos  0      

 

 Immature Cells         

 

 Neutrophils (Absolute)  6.6     1.4-7.0

 

 Lymphs (Absolute)  3.1     0.7-3.1

 

 Monocytes(Absolute)  0.5     0.1-0.9

 

 Eos (Absolute)  0.2     0.0-0.4

 

 Baso (Absolute)  0.0     0.0-0.2

 

 Immature Granulocytes  0      

 

 Immature Grans (Abs)  0.0     0.0-0.1

 

 [facility]         

 

 Hematology Comments:         

 

 LIPID PANEL     2016   

 

 Cholesterol, Total  201     100-199

 

 Triglycerides  105     0-149

 

 HDL Cholesterol  31     >39

 

 VLDL Cholesterol Severino  21     5-40

 

 LDL Cholesterol Calc  149     0-99

 

 Comment:         

 

 CMP     2016   

 

 Glucose, Serum  93     65-99

 

 BUN  9     6-24

 

 Creatinine, Serum  0.96     0.76-1.27

 

 eGFR If NonAfricn Am  96         >59

 

 eGFR If Africn Am  111         >59

 

 BUN/Creatinine Ratio  9     9-20

 

 Sodium, Serum  142     134-144

 

 Potassium, Serum  4.4     3.5-5.2

 

 Chloride, Serum  101     

 

 Carbon Dioxide, Total  25     18-29

 

 Calcium, Serum  9.2     8.7-10.2

 

 Protein, Total, Serum  7.2     6.0-8.5

 

 Albumin, Serum  4.1     3.5-5.5

 

 Globulin, Total  3.1     1.5-4.5

 

 A/G Ratio  1.3     1.1-2.5

 

 Bilirubin, Total  0.2     0.0-1.2

 

 Alkaline Phosphatase, S  93     

 

 AST (SGOT)  16     0-40

 

 ALT (SGPT)  26     0-44

 

 A1C (IN HOUSE)     2016   

 

 A1C IN HOUSE  5.6     4.3 - 5.6 %

 

 Previous A1c         

 

 Lot   [id number]      

 

 Exp date  2017      







PROCEDURES







 Procedure  Date Ordered  Related Diagnosis  Body Site

 

 GLYCATED HEMOGLOBIN TEST  Aug 08, 2016      

 

 Office Visit, Est Pt., Level 3  Aug 08, 2016      

 

 VENIPUNCT, ROUTINE*  Aug 08, 2016      

 

 COMPLETE CBC W/AUTO DIFF WBC  Aug 08, 2016      

 

 ASSAY THYROID STIM HORMONE  Aug 08, 2016      

 

 COMPREHEN METABOLIC PANEL  Aug 08, 2016      

 

 LIPID PANEL  Aug 08, 2016      







IMMUNIZATIONS

No Known Immunizations

## 2018-02-15 NOTE — XMS REPORT
[facility]

 Created on: 2017



Antonio Whitten

External Reference #: [id number]

: 1970

Sex: Male



Demographics







 Address  [address]  [address]

 

 Preferred Language  Unknown

 

 Marital Status  Unknown

 

 Sikhism Affiliation  Unknown

 

 Race  Unknown

 

 Ethnic Group  Unknown





Author







 Author  ALVAREZ VALDEZ

 

 [facility]

 

 Address  [address] [place]  [address]



 

 Phone  [phone]







Care Team Providers







 Care Team Member Name  Role  Phone

 

 ALVAREZ VALDEZ  Unavailable  [phone]







PROBLEMS







 Type  Condition  ICD9-CM Code  SII48-XF Code  Onset Dates  Condition Status  
SNOMED Code

 

 Problem  Hypothyroidism (acquired)     E03.9     Active  70263861

 

 Problem  Chronic pain syndrome     G89.4     Active  234631246

 

 Problem  Anxiety     F41.9     Active  96629935

 

 Problem  Acquired hypothyroidism     E03.9     Active  687848705







ALLERGIES

No Information



SOCIAL HISTORY

Never Assessed



PLAN OF CARE





VITAL SIGNS





MEDICATIONS







 Medication  Instructions  Dosage  Frequency  Start Date  End Date  Duration  
Status

 

 Norco 5-325 MG  Orally every 6 hrs  1 tablet as needed  6h  26 May, 2017     
28 days  Active

 

 Ativan 1 MG  Orally every 6 hrs  1 tablet as needed  6h  13 Oct, 2016     28 
days  Active







RESULTS

No Results



PROCEDURES

No Known procedures



IMMUNIZATIONS

No Known Immunizations



MEDICAL (GENERAL) HISTORY







 Type  Description  Date

 

 Medical History  mild anxiety   

 

 Surgical History  appendectomy  

 

 Hospitalization History  surgery   

 

 Hospitalization History  dizziness due to inner ear infection

## 2018-02-15 NOTE — XMS REPORT
[facility]

 Created on: 2017



Antonio Whitten

External Reference #: [id number]

: 1970

Sex: Male



Demographics







 Address  [address] [place]  [address]

 

 Preferred Language  Unknown

 

 Marital Status  Unknown

 

 Pentecostalism Affiliation  Unknown

 

 Race  Unknown

 

 Ethnic Group  Unknown





Author







 Author  ALVAREZ VALDEZ

 

 [facility]

 

 Address  [address] [place]  [address]



 

 Phone  [phone]







Care Team Providers







 Care Team Member Name  Role  Phone

 

 JOSÉ MIGUEL  ALVAREZ  Unavailable  [phone]







PROBLEMS







 Type  Condition  ICD9-CM Code  OCL41-KL Code  Onset Dates  Condition Status  
SNOMED Code

 

 Problem  Acute suppurative otitis media without spontaneous rupture of eardrum
  382.00        Active  60671138

 

 Problem  Unspecified dermatitis due to sun  692.70        Active  25403609

 

 Problem  Other malaise and fatigue  780.79        Active  828083796

 

 Assessment  Acquired hypothyroidism     E03.9  21 Sep, 2016  Active  810838363

 

 Problem  Acquired hypothyroidism     E03.9     Active  864299419

 

 Problem  Anxiety     F41.9     Active  19222391

 

 Problem  Health examination of defined subpopulation  V70.5        Active  
106115561

 

 Problem  Dizziness and giddiness  780.4        Active  789583438

 

 Problem  Hypertension  401.9        Active  42027390

 

 Problem  Need for prophylactic vaccination and inoculation, Influenza  V04.81 
       Active  825232128







ALLERGIES

Unknown Allergies



SOCIAL HISTORY

No smoking Hx information available



PLAN OF CARE





VITAL SIGNS





MEDICATIONS

Unknown Medications



RESULTS

No Results



PROCEDURES

No Known procedures



IMMUNIZATIONS

No Known Immunizations

## 2018-02-15 NOTE — XMS REPORT
[facility]

 Created on: 08/10/2016



Antonio Whitten

External Reference #: [id number]

: 1970

Sex: Male



Demographics







 Address  [address] [place] [address]  [address]

 

 Home Phone  [phone]

 

 Preferred Language  Unknown

 

 Marital Status  Unknown

 

 Confucianism Affiliation  Unknown

 

 Race  White

 

 Ethnic Group  Not  or 





Author







 Author  ALVAREZ VALDEZ

 

 Organization  [employer]

 

 Address  Unknown

 

 Phone  Unavailable







Care Team Providers







 Care Team Member Name  Role  Phone

 

 ALVAREZ VALDEZ  CP  Unavailable



                                                                



Allergies

          No Known Allergies                                                   
                                     



Problems

          





 Problem Type  Condition  Code  Onset Dates  Condition Status

 

 Problem  Need for prophylactic vaccination and inoculation, Influenza  V04.81 
    Active

 

 Problem  Health examination of defined subpopulation  V70.5     Active

 

 Problem  Hypertension  401.9     Active

 

 Problem  Other malaise and fatigue  780.79     Active

 

 Problem  Acute suppurative otitis media without spontaneous rupture of eardrum
  382.00     Active

 

 Problem  Dizziness and giddiness  780.4     Active

 

 Problem  Unspecified dermatitis due to sun  692.70     Active



                                                                               
                                                                     



Medications

          No Known Medications                                                 
                             



Results

          No Known Results                                                     
               



Summary Purpose

          [employer] Submission

## 2018-02-15 NOTE — XMS REPORT
[facility]

 Created on: 10/30/2015



Antonio Whitten

External Reference #: [id number]

: 1970

Sex: Male



Demographics







 Address  [address] [place] [address]  [address]

 

 Home Phone  [phone]

 

 Preferred Language  Unknown

 

 Marital Status  Unknown

 

 Quaker Affiliation  Unknown

 

 Race  White

 

 Ethnic Group  Not  or 





Author







 Author  ALVAREZ VALDEZ

 

 Organization  [employer]

 

 Address  Unknown

 

 Phone  Unavailable







Care Team Providers







 Care Team Member Name  Role  Phone

 

 ALVAREZ VALDEZ  CP  Unavailable



                                                                



Allergies

          No Known Allergies                                                   
                                     



Problems

          





 Problem Type  Condition  Code  Onset Dates  Condition Status

 

 Problem  Need for prophylactic vaccination and inoculation, Influenza  V04.81 
    Active

 

 Problem  Health examination of defined subpopulation  V70.5     Active

 

 Problem  Hypertension  401.9     Active

 

 Problem  Other malaise and fatigue  780.79     Active

 

 Problem  Acute suppurative otitis media without spontaneous rupture of eardrum
  382.00     Active

 

 Problem  Dizziness and giddiness  780.4     Active

 

 Problem  Unspecified dermatitis due to sun  692.70     Active



                                                                               
                                                                     



Medications

          





 Medication  Code System  Code  Instructions  Start Date  End Date  Status  
Dosage

 

 Omeprazole  NDC  [address]-62  20 MG Orally Once a day  Oct 30, 2015        1 
capsule



                                                                              



Results

          No Known Results                                                     
               



Summary Purpose

          [employer] Submission

## 2018-02-15 NOTE — XMS REPORT
[facility]

 Created on: 2016



Antonio Whitten

External Reference #: [id number]

: 1970

Sex: Male



Demographics







 Address  [address] [place] [address]  [address]

 

 Home Phone  [phone]

 

 Preferred Language  Unknown

 

 Marital Status  Unknown

 

 Sabianist Affiliation  Unknown

 

 Race  White

 

 Ethnic Group  Not  or 





Author







 Author  ALVAREZ VALDEZ

 

 Organization  [employer]

 

 Address  Unknown

 

 Phone  Unavailable







Care Team Providers







 Care Team Member Name  Role  Phone

 

 ALVAREZ VALDEZ  CP  Unavailable



                                                                



Allergies

          No Known Allergies                                                   
                                     



Problems

          





 Problem Type  Condition  Code  Onset Dates  Condition Status

 

 Problem  Acute suppurative otitis media without spontaneous rupture of eardrum
  382.00     Active

 

 Problem  Unspecified dermatitis due to sun  692.70     Active

 

 Problem  Other malaise and fatigue  780.79     Active

 

 Problem  Acquired hypothyroidism  E03.9     Active

 

 Problem  Anxiety  F41.9     Active

 

 Problem  Chronic pain syndrome  G89.4     Active

 

 Problem  Health examination of defined subpopulation  V70.5     Active

 

 Problem  Dizziness and giddiness  780.4     Active

 

 Problem  Hypertension  401.9     Active

 

 Problem  Need for prophylactic vaccination and inoculation, Influenza  V04.81 
    Active



                                                                               
                                                                               
                    



Medications

          No Known Medications                                                 
                             



Results

          No Known Results                                                     
               



Summary Purpose

          [employer] Submission

## 2018-02-15 NOTE — XMS REPORT
[facility]

 Created on: 2015



Antonio Whitten

External Reference #: [id number]

: 1970

Sex: Male



Demographics







 Address  [address] [place] [address]  [address]

 

 Home Phone  [phone]

 

 Preferred Language  Unknown

 

 Marital Status  Unknown

 

 Taoism Affiliation  Unknown

 

 Race  White

 

 Ethnic Group  Not  or 





Author







 Author  ALVAREZ VALDEZ

 

 [facility]  [employer]

 

 Address  Unknown

 

 Phone  Unavailable







Care Team Providers







 Care Team Member Name  Role  Phone

 

 ALVAREZ VALDEZ  CP  Unavailable



                                                                



Allergies, Adverse Reactions, Alerts

          





 Substance  Reaction  Event Type

 

 N.K.D.A.  Info Not Available  Non Drug Allergy



                                                                               
         



Problems

          





 Problem Type  Condition  Code  Onset Dates  Condition Status

 

 Assessment  Encounter for pre-employment examination  Z02.1     Active

 

 Problem  Need for prophylactic vaccination and inoculation, Influenza  V04.81 
    Active

 

 Problem  Health examination of defined subpopulation  V70.5     Active

 

 Problem  Hypertension  401.9     Active

 

 Problem  Other malaise and fatigue  780.79     Active

 

 Problem  Acute suppurative otitis media without spontaneous rupture of eardrum
  382.00     Active

 

 Problem  Dizziness and giddiness  780.4     Active

 

 Problem  Unspecified dermatitis due to sun  692.70     Active



                                                                               
                                                                               



Medications

          





 Medication  Code System  Code  Instructions  Start Date  End Date  Status  
Dosage

 

 Diclofenac Sodium  NDC  [address]-62  75 MG Orally Twice a day  2015
        1 tablet

 

 Omeprazole  NDC  [address]-47  20 MG Orally Once a day  Oct 30, 2015        1 
capsule

 

 Lexapro  NDC  10518563508  10 MG Orally Once a day           1 tablet



                                                                               
                             



Procedures

          





 Procedure  Coding System  Code  Date

 

 Office Visit, Est Pt., Level 3  CPT-4  24974  2015

 

 URINALYSIS, AUTO, W/O SCOPE  CPT-4  26134  2015



                                                                               
                             



Vital Signs

          





 Date/Time:  2015

 

 Temperature  98.3 F

 

 Weight  260.2 lbs

 

 Height  71 in

 

 BMI  36.29 Index

 

 Blood Pressure Diastolic  84 mmHg

 

 Blood Pressure Systolic  122 mmHg

 

 Cardiac Monitoring Heart Rate  104 bpm



                                                                    



Results

          





 Name  Result  Date  Reference Range  Unit  Abnormality Flag

 

 UA LONG DIP (IN HOUSE)               



                                                                    



Summary Purpose

          [employer] Submission

## 2018-03-13 ENCOUNTER — HOSPITAL ENCOUNTER (OUTPATIENT)
Dept: HOSPITAL 75 - SLEEP | Age: 48
Discharge: HOME | End: 2018-03-13
Attending: NURSE PRACTITIONER
Payer: COMMERCIAL

## 2018-03-13 DIAGNOSIS — G47.50: ICD-10-CM

## 2018-03-13 DIAGNOSIS — G47.33: Primary | ICD-10-CM

## 2018-03-13 DIAGNOSIS — G47.10: ICD-10-CM

## 2018-04-09 ENCOUNTER — HOSPITAL ENCOUNTER (OUTPATIENT)
Dept: HOSPITAL 75 - RT | Age: 48
End: 2018-04-09
Attending: NURSE PRACTITIONER
Payer: COMMERCIAL

## 2018-04-09 DIAGNOSIS — Z72.0: ICD-10-CM

## 2018-04-09 DIAGNOSIS — R06.00: Primary | ICD-10-CM

## 2018-04-09 PROCEDURE — 94726 PLETHYSMOGRAPHY LUNG VOLUMES: CPT

## 2018-04-09 PROCEDURE — 94060 EVALUATION OF WHEEZING: CPT

## 2018-04-09 PROCEDURE — 94729 DIFFUSING CAPACITY: CPT

## 2020-12-28 ENCOUNTER — HOSPITAL ENCOUNTER (OUTPATIENT)
Dept: HOSPITAL 75 - PREOP | Age: 50
Discharge: HOME | End: 2020-12-28
Attending: SURGERY
Payer: COMMERCIAL

## 2020-12-28 VITALS — WEIGHT: 257.94 LBS | BODY MASS INDEX: 34.94 KG/M2 | HEIGHT: 72.01 IN

## 2020-12-28 DIAGNOSIS — Z86.010: ICD-10-CM

## 2020-12-28 DIAGNOSIS — Z20.828: ICD-10-CM

## 2020-12-28 DIAGNOSIS — Z01.812: Primary | ICD-10-CM

## 2020-12-28 DIAGNOSIS — Z80.0: ICD-10-CM

## 2020-12-28 PROCEDURE — 87635 SARS-COV-2 COVID-19 AMP PRB: CPT

## 2020-12-29 ENCOUNTER — HOSPITAL ENCOUNTER (OUTPATIENT)
Dept: HOSPITAL 75 - ENDO | Age: 50
Discharge: HOME | End: 2020-12-29
Attending: SURGERY
Payer: COMMERCIAL

## 2020-12-29 ENCOUNTER — HOSPITAL ENCOUNTER (OUTPATIENT)
Dept: HOSPITAL 75 - PREOP | Age: 50
Discharge: HOME | End: 2020-12-29
Attending: OTOLARYNGOLOGY
Payer: COMMERCIAL

## 2020-12-29 VITALS — BODY MASS INDEX: 34.94 KG/M2 | HEIGHT: 72.01 IN | WEIGHT: 257.94 LBS

## 2020-12-29 VITALS — SYSTOLIC BLOOD PRESSURE: 119 MMHG | DIASTOLIC BLOOD PRESSURE: 83 MMHG

## 2020-12-29 VITALS — SYSTOLIC BLOOD PRESSURE: 115 MMHG | DIASTOLIC BLOOD PRESSURE: 77 MMHG

## 2020-12-29 VITALS — BODY MASS INDEX: 34.94 KG/M2 | WEIGHT: 257.94 LBS | HEIGHT: 72.05 IN

## 2020-12-29 VITALS — DIASTOLIC BLOOD PRESSURE: 80 MMHG | SYSTOLIC BLOOD PRESSURE: 135 MMHG

## 2020-12-29 VITALS — DIASTOLIC BLOOD PRESSURE: 104 MMHG | SYSTOLIC BLOOD PRESSURE: 132 MMHG

## 2020-12-29 VITALS — SYSTOLIC BLOOD PRESSURE: 135 MMHG | DIASTOLIC BLOOD PRESSURE: 80 MMHG

## 2020-12-29 DIAGNOSIS — M19.90: ICD-10-CM

## 2020-12-29 DIAGNOSIS — H65.20: ICD-10-CM

## 2020-12-29 DIAGNOSIS — K21.9: ICD-10-CM

## 2020-12-29 DIAGNOSIS — K64.8: ICD-10-CM

## 2020-12-29 DIAGNOSIS — J44.9: ICD-10-CM

## 2020-12-29 DIAGNOSIS — K92.1: ICD-10-CM

## 2020-12-29 DIAGNOSIS — J34.89: ICD-10-CM

## 2020-12-29 DIAGNOSIS — G47.33: ICD-10-CM

## 2020-12-29 DIAGNOSIS — G47.10: ICD-10-CM

## 2020-12-29 DIAGNOSIS — Z01.818: Primary | ICD-10-CM

## 2020-12-29 DIAGNOSIS — F41.9: ICD-10-CM

## 2020-12-29 DIAGNOSIS — Z86.010: ICD-10-CM

## 2020-12-29 DIAGNOSIS — K29.70: ICD-10-CM

## 2020-12-29 DIAGNOSIS — F32.9: ICD-10-CM

## 2020-12-29 DIAGNOSIS — D12.5: Primary | ICD-10-CM

## 2020-12-29 DIAGNOSIS — Z80.0: ICD-10-CM

## 2020-12-29 DIAGNOSIS — Z79.899: ICD-10-CM

## 2020-12-29 LAB
BASOPHILS # BLD AUTO: 0.1 10^3/UL (ref 0–0.1)
BASOPHILS NFR BLD AUTO: 1 % (ref 0–10)
BUN/CREAT SERPL: 13
CALCIUM SERPL-MCNC: 9 MG/DL (ref 8.5–10.1)
CHLORIDE SERPL-SCNC: 105 MMOL/L (ref 98–107)
CO2 SERPL-SCNC: 28 MMOL/L (ref 21–32)
CREAT SERPL-MCNC: 1.1 MG/DL (ref 0.6–1.3)
EOSINOPHIL # BLD AUTO: 0.1 10^3/UL (ref 0–0.3)
EOSINOPHIL NFR BLD AUTO: 1 % (ref 0–10)
GFR SERPLBLD BASED ON 1.73 SQ M-ARVRAT: > 60 ML/MIN
GLUCOSE SERPL-MCNC: 101 MG/DL (ref 70–105)
HCT VFR BLD CALC: 50 % (ref 40–54)
HGB BLD-MCNC: 16.2 G/DL (ref 13.3–17.7)
LYMPHOCYTES # BLD AUTO: 2.1 10^3/UL (ref 1–4)
LYMPHOCYTES NFR BLD AUTO: 20 % (ref 12–44)
MANUAL DIFFERENTIAL PERFORMED BLD QL: NO
MCH RBC QN AUTO: 31 PG (ref 25–34)
MCHC RBC AUTO-ENTMCNC: 33 G/DL (ref 32–36)
MCV RBC AUTO: 96 FL (ref 80–99)
MONOCYTES # BLD AUTO: 0.5 10^3/UL (ref 0–1)
MONOCYTES NFR BLD AUTO: 5 % (ref 0–12)
NEUTROPHILS # BLD AUTO: 8.1 10^3/UL (ref 1.8–7.8)
NEUTROPHILS NFR BLD AUTO: 74 % (ref 42–75)
PLATELET # BLD: 227 10^3/UL (ref 130–400)
PMV BLD AUTO: 10.7 FL (ref 9–12.2)
POTASSIUM SERPL-SCNC: 4 MMOL/L (ref 3.6–5)
SODIUM SERPL-SCNC: 140 MMOL/L (ref 135–145)
WBC # BLD AUTO: 10.8 10^3/UL (ref 4.3–11)

## 2020-12-29 PROCEDURE — 87081 CULTURE SCREEN ONLY: CPT

## 2020-12-29 PROCEDURE — 88305 TISSUE EXAM BY PATHOLOGIST: CPT

## 2020-12-29 PROCEDURE — 36415 COLL VENOUS BLD VENIPUNCTURE: CPT

## 2020-12-29 PROCEDURE — 85025 COMPLETE CBC W/AUTO DIFF WBC: CPT

## 2020-12-29 PROCEDURE — 93005 ELECTROCARDIOGRAM TRACING: CPT

## 2020-12-29 PROCEDURE — 80048 BASIC METABOLIC PNL TOTAL CA: CPT

## 2020-12-29 NOTE — PROGRESS NOTE-POST OPERATIVE
Post-Operative Progess Note


Surgeon (s)/Assistant (s)


Surgeon


ELISA PETTIT DO


Assistant:  na





Pre-Operative Diagnosis


blood in stool, hx polyps





Post-Operative Diagnosis





internal hemorrhoid, colon polyps





Procedure & Operative Findings


Date of Procedure


12/29/20


Procedure Performed/Findings


colonoscopy c hot bx polypectomy x 3


Anesthesia Type


per crna





Estimated Blood Loss


Estimated blood loss (mL):  none





Specimens/Packing


Specimens Removed


 colon polyps











ELISA PETTIT DO              Dec 29, 2020 14:46

## 2020-12-29 NOTE — DISCHARGE INST-SIMPLE/STANDARD
Discharge Inst-Standard


Patient Instructions/Follow Up


Plan of Care/Instructions/FU:  


2 weeks Priti


Activity as Tolerated:  Yes


Discharge Diet:  Regular Diet (high fiber)











ELISA PETTIT DO              Dec 29, 2020 14:47

## 2020-12-29 NOTE — PROGRESS NOTE-PRE OPERATIVE
Pre-Operative Progress Note


H&P Reviewed


The H&P was reviewed, patient examined and no changes noted.


Date Seen by Provider:  Dec 29, 2020


Time Seen by Provider:  14:25


Date H&P Reviewed:  Dec 29, 2020


Time H&P Reviewed:  14:25


Pre-Operative Diagnosis:  blood in stool, hx polyps











ELISA PETTIT DO              Dec 29, 2020 14:26

## 2020-12-29 NOTE — OPERATIVE REPORT
DATE OF SERVICE:  12/29/2020



PREOPERATIVE DIAGNOSES:

Blood in stool, history of polyps.



POSTOPERATIVE DIAGNOSES:

Internal hemorrhoids, colon polyps.



PROCEDURE:

Colonoscopy with hot biopsy polypectomy x3.



SURGEON:

Elisa Marcos DO



ANESTHESIA:

Per CRNA.



ESTIMATED BLOOD LOSS:

None.



COMPLICATIONS:

None.



SPECIMENS:

Colon polyps x3.



INDICATIONS:

The patient is a 50-year-old male with bright red bleeding per rectum.  He

understands risks and benefits of procedure and wished to proceed with

procedure.  Consent was signed on the chart.



DESCRIPTION OF PROCEDURE:

The patient was taken to the endoscopy suite, placed in left lower recumbent

position.  Timeout was performed.  Digital rectal exam was performed.  There

were no palpable polyps, masses or ulcerations.  A small external hemorrhoid and

multiple large internal hemorrhoids.  Scope was inserted in the rectum and

advanced all the way to cecum with minimal difficulty.  Prep was adequate.  In

the cecum was very small polyp was present, which hot biopsy polypectomy was

performed.  Scope was then slowly withdrawn.  No polyps, masses or ulcerations

within the ascending, transverse and descending colon.  In the sigmoid colon,

there were 2 small polyps, which hot biopsy polypectomy was performed.  Scope

was then continued to be slowly retracted back into the rectum, where it was

also retroflexed noting internal hemorrhoidal disease.  No other pathology. 

Scope was returned to its normal position, slowly withdrawn until completely

removed.  The patient tolerated procedure well without any complications, taken

to recovery room in stable condition.



RECOMMENDATIONS:

The patient would consider hemorrhoid energy therapy treatment for hemorrhoids. 

The patient will follow up on pathology in regards to the colon polypectomies. 

The patient will need repeat colonoscopy in 5 years.  Any issues before that be

seen at that time.





Job ID: [id number]

DocumentID: [id number]

Dictated Date:  12/29/2020 14:50:35

Transcription Date: 12/29/2020 22:45:52

Dictated By: ELISA MARCOS DO

## 2020-12-31 ENCOUNTER — HOSPITAL ENCOUNTER (OUTPATIENT)
Dept: HOSPITAL 75 - SDC | Age: 50
End: 2020-12-31
Attending: OTOLARYNGOLOGY
Payer: COMMERCIAL

## 2020-12-31 VITALS — SYSTOLIC BLOOD PRESSURE: 140 MMHG | DIASTOLIC BLOOD PRESSURE: 80 MMHG

## 2020-12-31 VITALS — DIASTOLIC BLOOD PRESSURE: 72 MMHG | SYSTOLIC BLOOD PRESSURE: 113 MMHG

## 2020-12-31 VITALS — BODY MASS INDEX: 35.32 KG/M2 | WEIGHT: 257.94 LBS | HEIGHT: 71.65 IN

## 2020-12-31 VITALS — DIASTOLIC BLOOD PRESSURE: 71 MMHG | SYSTOLIC BLOOD PRESSURE: 120 MMHG

## 2020-12-31 VITALS — SYSTOLIC BLOOD PRESSURE: 111 MMHG | DIASTOLIC BLOOD PRESSURE: 73 MMHG

## 2020-12-31 VITALS — DIASTOLIC BLOOD PRESSURE: 73 MMHG | SYSTOLIC BLOOD PRESSURE: 102 MMHG

## 2020-12-31 VITALS — DIASTOLIC BLOOD PRESSURE: 83 MMHG | SYSTOLIC BLOOD PRESSURE: 140 MMHG

## 2020-12-31 VITALS — DIASTOLIC BLOOD PRESSURE: 76 MMHG | SYSTOLIC BLOOD PRESSURE: 138 MMHG

## 2020-12-31 VITALS — DIASTOLIC BLOOD PRESSURE: 65 MMHG | SYSTOLIC BLOOD PRESSURE: 117 MMHG

## 2020-12-31 DIAGNOSIS — Z79.51: ICD-10-CM

## 2020-12-31 DIAGNOSIS — H65.23: Primary | ICD-10-CM

## 2020-12-31 DIAGNOSIS — E66.9: ICD-10-CM

## 2020-12-31 DIAGNOSIS — E03.9: ICD-10-CM

## 2020-12-31 DIAGNOSIS — Z82.3: ICD-10-CM

## 2020-12-31 DIAGNOSIS — Z79.899: ICD-10-CM

## 2020-12-31 DIAGNOSIS — Z80.9: ICD-10-CM

## 2020-12-31 DIAGNOSIS — J39.2: ICD-10-CM

## 2020-12-31 DIAGNOSIS — Z83.3: ICD-10-CM

## 2020-12-31 DIAGNOSIS — K21.9: ICD-10-CM

## 2020-12-31 DIAGNOSIS — M19.90: ICD-10-CM

## 2020-12-31 DIAGNOSIS — J44.9: ICD-10-CM

## 2020-12-31 PROCEDURE — 88305 TISSUE EXAM BY PATHOLOGIST: CPT

## 2020-12-31 PROCEDURE — 88331 PATH CONSLTJ SURG 1 BLK 1SPC: CPT

## 2020-12-31 NOTE — ANESTHESIA-GENERAL POST-OP
General


Patient Condition


Mental Status/LOC:  Same as Preop


Cardiovascular:  Satisfactory


Nausea/Vomiting:  Absent


Respiratory:  Satisfactory


Pain:  Controlled


Complications:  Absent





Post Op Complications


Complications


None





Follow Up Care/Instructions


Patient Instructions


None needed.





Anesthesia/Patient Condition


Patient Condition


Patient is doing well, has a mild sore throat which is to be expected, stable 

vital signs, no apparent adverse anesthesia problems.











HOLLY DU DO         Dec 31, 2020 08:50

## 2020-12-31 NOTE — PROGRESS NOTE-PRE OPERATIVE
Pre-Operative Progress Note


H&P Reviewed


The H&P was reviewed, patient examined and no changes noted.


Date Seen by Provider:  Dec 31, 2020


Time Seen by Provider:  06:45


Date H&P Reviewed:  Dec 31, 2020


Time H&P Reviewed:  06:45


Pre-Operative Diagnosis:  Nasopharyngeal MaSS, bILAT BING Gaines MD             Dec 31, 2020 07:04

## 2020-12-31 NOTE — PROGRESS NOTE-POST OPERATIVE
Post-Operative Progess Note


Surgeon (s)/Assistant (s)


Surgeon


BING LEHMAN MD


Assistant


n/a





Pre-Operative Diagnosis


Nasopharyngeal MaSS, bILAT jesús





Post-Operative Diagnosis


same





Post-Op Procedure Note


Date of Procedure:  Dec 31, 2020


Name of Procedure Performed:  


BMT, Removal of Nasopharyngeal Mass


Description & Findings


Description and Findings:





n/a


Anesthesia Type


get


Estimated Blood Loss


minimal


Packing


none.


Specimen(s) collected/removed


nasopharyngeal mass fresh to pathology











BING LEHMAN MD             Dec 31, 2020 08:18

## 2021-02-11 ENCOUNTER — HOSPITAL ENCOUNTER (OUTPATIENT)
Dept: HOSPITAL 75 - RAD | Age: 51
End: 2021-02-11
Attending: PHYSICIAN ASSISTANT
Payer: COMMERCIAL

## 2021-02-11 DIAGNOSIS — M51.16: Primary | ICD-10-CM

## 2021-02-11 PROCEDURE — 72148 MRI LUMBAR SPINE W/O DYE: CPT

## 2021-02-11 NOTE — DIAGNOSTIC IMAGING REPORT
PROCEDURE: MRI lumbar spine.



TECHNIQUE: Multiplanar, multisequence MRI of the lumbar spine was

performed without contrast.



INDICATION: Low back pain.



COMPARISON: There are no prior studies available for comparison.



FINDINGS: The reconstructed T2 parasagittal images show the

vertebral body heights and alignment to be generally within

normal limits. The intervertebral spaces are fairly

well-maintained, although there is desiccation of the disc at

every level.



The thecal sac is generous. There is a slight disc bulge

centrally at the L3-L4 level. The disc flattens the ventral

aspect of the thecal sac and narrows the AP diameter to 14.2 mm.

There are also minimal disc bulges at L2-L3, L4-L5 and L5-S1.

There is no evidence for spinal stenosis or nerve root

encroachment at any of these levels or any other level of the

lumbar spine.



There is no abnormal signal arising from the osseous structures

to suggest bone edema or fracture.



There is no sign of a paraspinal mass.



IMPRESSION:

1. There is mild degenerative disc disease throughout the lumbar

spine. The L3-L4 level is the most severely affected but there is

no evidence for spinal stenosis or nerve root encroachment at

this level. There is no other sign of a high-grade central

stenosis or neural foraminal narrowing either.

2. There is no sign of an acute bony abnormality or of a cord

lesion.



Dictated by: 



  Dictated on workstation # [id number]